# Patient Record
Sex: FEMALE | Race: BLACK OR AFRICAN AMERICAN | NOT HISPANIC OR LATINO | Employment: UNEMPLOYED | URBAN - METROPOLITAN AREA
[De-identification: names, ages, dates, MRNs, and addresses within clinical notes are randomized per-mention and may not be internally consistent; named-entity substitution may affect disease eponyms.]

---

## 2018-04-06 PROBLEM — I10 BENIGN HYPERTENSION: Status: ACTIVE | Noted: 2018-04-06

## 2018-04-06 PROBLEM — E78.5 HYPERLIPIDEMIA: Status: ACTIVE | Noted: 2018-04-06

## 2018-04-06 PROBLEM — K21.9 GERD (GASTROESOPHAGEAL REFLUX DISEASE): Status: ACTIVE | Noted: 2018-04-06

## 2018-05-10 ENCOUNTER — OFFICE VISIT (OUTPATIENT)
Dept: FAMILY MEDICINE | Facility: CLINIC | Age: 63
End: 2018-05-10
Payer: MEDICARE

## 2018-05-10 VITALS
DIASTOLIC BLOOD PRESSURE: 66 MMHG | SYSTOLIC BLOOD PRESSURE: 141 MMHG | BODY MASS INDEX: 28.88 KG/M2 | HEIGHT: 67 IN | RESPIRATION RATE: 18 BRPM | HEART RATE: 75 BPM | WEIGHT: 184 LBS

## 2018-05-10 DIAGNOSIS — E78.2 MIXED HYPERLIPIDEMIA: ICD-10-CM

## 2018-05-10 DIAGNOSIS — L72.0 EPIDERMOID CYST: Primary | ICD-10-CM

## 2018-05-10 PROCEDURE — 3008F BODY MASS INDEX DOCD: CPT | Mod: CPTII,S$GLB,, | Performed by: NURSE PRACTITIONER

## 2018-05-10 PROCEDURE — 99213 OFFICE O/P EST LOW 20 MIN: CPT | Mod: S$GLB,,, | Performed by: NURSE PRACTITIONER

## 2018-05-10 RX ORDER — POTASSIUM CHLORIDE 750 MG/1
10 TABLET, EXTENDED RELEASE ORAL DAILY
COMMUNITY
Start: 2018-04-02 | End: 2024-03-07

## 2018-05-10 RX ORDER — PHENOL/SODIUM PHENOLATE
20 AEROSOL, SPRAY (ML) MUCOUS MEMBRANE DAILY
COMMUNITY
End: 2018-08-07 | Stop reason: SDUPTHER

## 2018-05-10 RX ORDER — LOSARTAN POTASSIUM AND HYDROCHLOROTHIAZIDE 12.5; 1 MG/1; MG/1
1 TABLET ORAL DAILY
COMMUNITY
End: 2018-08-14 | Stop reason: SDUPTHER

## 2018-05-10 RX ORDER — AMLODIPINE BESYLATE 10 MG/1
10 TABLET ORAL DAILY
COMMUNITY
Start: 2018-04-02

## 2018-05-10 RX ORDER — PRAVASTATIN SODIUM 40 MG/1
40 TABLET ORAL DAILY
Qty: 90 TABLET | Refills: 4 | Status: SHIPPED | OUTPATIENT
Start: 2018-05-10 | End: 2024-03-07

## 2018-05-10 RX ORDER — DULOXETIN HYDROCHLORIDE 30 MG/1
30 CAPSULE, DELAYED RELEASE ORAL DAILY
Refills: 1 | COMMUNITY
Start: 2018-04-23 | End: 2018-08-14 | Stop reason: SDUPTHER

## 2018-05-10 RX ORDER — CLOTRIMAZOLE 1 %
1 CREAM (GRAM) TOPICAL DAILY
COMMUNITY
Start: 2018-05-03 | End: 2018-08-14 | Stop reason: SDUPTHER

## 2018-05-10 RX ORDER — PRAVASTATIN SODIUM 40 MG/1
40 TABLET ORAL DAILY
COMMUNITY
End: 2018-05-10 | Stop reason: SDUPTHER

## 2018-05-10 NOTE — PATIENT INSTRUCTIONS
Patient declines removal of left axilla epidermoid cyst at this time.  State she will return later if it begins to bother her. Mammogram results from 02/01/2018 reviewed- results normal. Pravastatin refilled.   Fasting labs ordered for net visit.

## 2018-05-10 NOTE — PROGRESS NOTES
"Chief Complaint  Chief Complaint   Patient presents with    Recurrent Skin Infections     right armpit x2 weeks        HPI  Rachel Sandoval is a 63 y.o. female with medical diagnoses as listed within the medical history and problem list that presents for lump under right axilla.     PAST MEDICAL HISTORY:    HTN, Depression, high cholesterol, GERD  PAST SURGICAL HISTORY:  History reviewed. No pertinent surgical history.    SOCIAL HISTORY:  Single, 5 adult children, current smoker    FAMILY HISTORY:  History reviewed. No pertinent family history.    ALLERGIES AND MEDICATIONS: updated and reviewed.  Review of patient's allergies indicates:  No Known Allergies  Current Outpatient Prescriptions   Medication Sig Dispense Refill    amLODIPine (NORVASC) 10 MG tablet Take 10 mg by mouth once daily.      clotrimazole (LOTRIMIN) 1 % cream Apply 1 application topically once daily.      DULoxetine (CYMBALTA) 30 MG capsule Take 30 mg by mouth once daily.  1    losartan-hydrochlorothiazide 100-12.5 mg (HYZAAR) 100-12.5 mg Tab Take 1 tablet by mouth once daily.      omeprazole 20 mg TbEC Take 20 mg by mouth once daily.      potassium chloride SA (K-DUR,KLOR-CON) 10 MEQ tablet Take 10 mEq by mouth once daily.      pravastatin (PRAVACHOL) 40 MG tablet Take 1 tablet (40 mg total) by mouth once daily. 90 tablet 4     No current facility-administered medications for this visit.          ROS  Review of Systems   Constitutional: Negative.    HENT: Negative.    Respiratory: Negative.    Cardiovascular: Negative.    Gastrointestinal: Negative.    Skin:        Firm mobile lump right axilla   Psychiatric/Behavioral: Negative.            PHYSICAL EXAM  Vitals:    05/10/18 1158   BP: (!) 141/66   BP Location: Left arm   Patient Position: Sitting   BP Method: Medium (Automatic)   Pulse: 75   Resp: 18   Weight: 83.5 kg (184 lb)   Height: 5' 7" (1.702 m)    Body mass index is 28.82 kg/m².  Weight: 83.5 kg (184 lb)   Height: 5' 7" (170.2 " cm)       Physical Exam   Constitutional: She is oriented to person, place, and time. She appears well-developed and well-nourished.   HENT:   Head: Normocephalic and atraumatic.   Neck: Normal range of motion. Neck supple.   Cardiovascular: Normal rate, regular rhythm and normal heart sounds.    Pulmonary/Chest: Effort normal and breath sounds normal. Right breast exhibits no inverted nipple, no mass, no nipple discharge, no skin change and no tenderness. Left breast exhibits no inverted nipple, no mass, no nipple discharge, no skin change and no tenderness. Breasts are symmetrical. There is no breast swelling.       Abdominal: Soft. Bowel sounds are normal.   Genitourinary: No breast tenderness, discharge or bleeding.   Musculoskeletal: Normal range of motion.   Lymphadenopathy:        Right axillary: No pectoral and no lateral adenopathy present.        Left axillary: No pectoral and no lateral adenopathy present.  Neurological: She is alert and oriented to person, place, and time.   Skin: Skin is warm and dry.   approx 2 cm firm mobile nontender nondraining mass in right axilla          Health Maintenance       Date Due Completion Date    Hepatitis C Screening 1955 ---    Lipid Panel 1955 ---    TETANUS VACCINE 02/12/1972 ---    Pap Smear with HPV Cotest 02/12/1975 ---    Mammogram 02/12/1994 ---    Colonoscopy 02/12/2004 ---    Zoster Vaccine 02/12/2014 ---    Influenza Vaccine 08/01/2018 ---               Assessment & Plan    Rachel was seen today for recurrent skin infections.    Diagnoses and all orders for this visit:    Epidermoid cyst    Mixed hyperlipidemia  -     Lipid panel; Future  -     Comprehensive metabolic panel; Future    Other orders  -     pravastatin (PRAVACHOL) 40 MG tablet; Take 1 tablet (40 mg total) by mouth once daily.        Follow-up: Follow-up if symptoms worsen or fail to improve.

## 2018-05-24 ENCOUNTER — OFFICE VISIT (OUTPATIENT)
Dept: FAMILY MEDICINE | Facility: CLINIC | Age: 63
End: 2018-05-24
Payer: MEDICARE

## 2018-05-24 VITALS
HEART RATE: 83 BPM | DIASTOLIC BLOOD PRESSURE: 72 MMHG | SYSTOLIC BLOOD PRESSURE: 133 MMHG | BODY MASS INDEX: 29.35 KG/M2 | WEIGHT: 187 LBS | TEMPERATURE: 97 F | HEIGHT: 67 IN | OXYGEN SATURATION: 98 % | RESPIRATION RATE: 18 BRPM

## 2018-05-24 DIAGNOSIS — L85.8 KERATOSIS PILARIS: ICD-10-CM

## 2018-05-24 DIAGNOSIS — R22.31 MASS OF RIGHT AXILLA: Primary | ICD-10-CM

## 2018-05-24 DIAGNOSIS — I10 BENIGN HYPERTENSION: ICD-10-CM

## 2018-05-24 DIAGNOSIS — E78.2 MIXED HYPERLIPIDEMIA: ICD-10-CM

## 2018-05-24 DIAGNOSIS — R21 RASH: ICD-10-CM

## 2018-05-24 PROCEDURE — 3008F BODY MASS INDEX DOCD: CPT | Mod: CPTII,S$GLB,, | Performed by: FAMILY MEDICINE

## 2018-05-24 PROCEDURE — 99213 OFFICE O/P EST LOW 20 MIN: CPT | Mod: S$GLB,,, | Performed by: FAMILY MEDICINE

## 2018-05-24 PROCEDURE — 3078F DIAST BP <80 MM HG: CPT | Mod: CPTII,S$GLB,, | Performed by: FAMILY MEDICINE

## 2018-05-24 PROCEDURE — 3075F SYST BP GE 130 - 139MM HG: CPT | Mod: CPTII,S$GLB,, | Performed by: FAMILY MEDICINE

## 2018-05-24 NOTE — PROGRESS NOTES
Subjective:       Patient ID: Rachel Sandoval is a 63 y.o. female.    Chief Complaint: Arm Pain (knot under her arm)    Complains of a lump in her right axilla.  Has been present for over 6 months, but seems to be getting larger and is now having some tenderness radiation up her arm.    She also has multiple raised bumps on her arms and legs.            Hyperlipidemia   This is a chronic problem. The current episode started more than 1 year ago. The problem is controlled. Recent lipid tests were reviewed and are normal. She has no history of chronic renal disease, diabetes, hypothyroidism or liver disease. Pertinent negatives include no chest pain, focal sensory loss, focal weakness, myalgias or shortness of breath. Current antihyperlipidemic treatment includes statins. The current treatment provides significant improvement of lipids. There are no compliance problems.    Hypertension   This is a chronic problem. The current episode started more than 1 year ago. The problem is unchanged. The problem is controlled. Pertinent negatives include no anxiety, chest pain, headaches, malaise/fatigue, neck pain, palpitations, peripheral edema or shortness of breath. There is no history of chronic renal disease.     Review of Systems   Constitutional: Negative for activity change, appetite change, chills, fatigue and malaise/fatigue.   HENT: Negative for congestion, ear discharge, ear pain, rhinorrhea, sinus pain, sore throat and trouble swallowing.    Eyes: Negative for photophobia, pain, redness, itching and visual disturbance.   Respiratory: Negative for cough, chest tightness, shortness of breath and wheezing.    Cardiovascular: Negative for chest pain, palpitations and leg swelling.   Gastrointestinal: Negative for abdominal distention, abdominal pain, blood in stool, diarrhea, nausea and vomiting.   Genitourinary: Negative for dysuria, pelvic pain, vaginal bleeding, vaginal discharge and vaginal pain.    Musculoskeletal: Negative for arthralgias, back pain, gait problem, myalgias and neck pain.   Skin: Negative for color change, pallor and rash.   Neurological: Negative for dizziness, tremors, focal weakness, weakness, light-headedness, numbness and headaches.   Psychiatric/Behavioral: Negative for agitation, behavioral problems, confusion and sleep disturbance.       Objective:      Physical Exam   Constitutional: She appears well-developed and well-nourished.   HENT:   Head: Normocephalic.   Right Ear: External ear normal.   Left Ear: External ear normal.   Mouth/Throat: Oropharynx is clear and moist.   Eyes: Conjunctivae and EOM are normal. Pupils are equal, round, and reactive to light.   Neck: Normal range of motion. Neck supple.   Cardiovascular: Normal rate, regular rhythm, normal heart sounds and intact distal pulses.    Pulmonary/Chest: Effort normal and breath sounds normal.   Neurological: She is alert.   Skin: Skin is warm and dry.   Has a 1 inch firm, non-moveable mass in her right axilla.  No redness or drainage present.      Multiple bumps on arms and legs consistent with pilaris keratosis.    Psychiatric: Her behavior is normal. Judgment normal.       ASSESSMENT and PLAN    Mass of right axilla  -     US Soft Tissue Axilla; Future; Expected date: 05/24/2018  -     CBC auto differential; Future; Expected date: 05/24/2018    Rash  -     Ambulatory referral to Dermatology    Benign hypertension  -     Basic metabolic panel; Future; Expected date: 05/24/2018    Mixed hyperlipidemia    Keratosis pilaris

## 2018-05-25 ENCOUNTER — HOSPITAL ENCOUNTER (OUTPATIENT)
Dept: RADIOLOGY | Facility: HOSPITAL | Age: 63
Discharge: HOME | End: 2018-05-25
Attending: FAMILY MEDICINE
Payer: MEDICARE

## 2018-05-25 DIAGNOSIS — R22.31 MASS OF RIGHT AXILLA: ICD-10-CM

## 2018-05-25 PROCEDURE — 76882 US LMTD JT/FCL EVL NVASC XTR: CPT | Mod: TC

## 2018-05-25 PROCEDURE — 76882 US LMTD JT/FCL EVL NVASC XTR: CPT | Mod: 26,,, | Performed by: RADIOLOGY

## 2018-08-07 RX ORDER — PHENOL/SODIUM PHENOLATE
20 AEROSOL, SPRAY (ML) MUCOUS MEMBRANE DAILY
COMMUNITY
Start: 2018-08-07 | End: 2024-03-07 | Stop reason: CLARIF

## 2018-08-14 RX ORDER — CLOTRIMAZOLE 1 %
1 CREAM (GRAM) TOPICAL DAILY
Qty: 60 G | Refills: 0 | Status: SHIPPED | OUTPATIENT
Start: 2018-08-14

## 2018-08-14 RX ORDER — DULOXETIN HYDROCHLORIDE 30 MG/1
30 CAPSULE, DELAYED RELEASE ORAL DAILY
Qty: 30 CAPSULE | Refills: 0 | Status: SHIPPED | OUTPATIENT
Start: 2018-08-14 | End: 2024-03-07 | Stop reason: SDUPTHER

## 2018-08-14 RX ORDER — LOSARTAN POTASSIUM AND HYDROCHLOROTHIAZIDE 12.5; 1 MG/1; MG/1
1 TABLET ORAL DAILY
Qty: 30 TABLET | Refills: 0 | Status: SHIPPED | OUTPATIENT
Start: 2018-08-14 | End: 2024-03-07

## 2018-08-14 NOTE — TELEPHONE ENCOUNTER
----- Message from Kalli Shepherd sent at 8/14/2018  1:55 PM CDT -----  Contact: Catherine from [facility]  .Type:  Pharmacy Calling to Clarify an RX    Name of Caller: Catherine  Pharm[facility] Name: [facility]   Prescription Name:  losartan-hydrochlorothiazide 100-12.5 mg (HYZAAR) 100-12.5 mg Tab, DULoxetine (CYMBALTA) 30 MG capsule, clotrimazole (LOTRIMIN) 1 % cream  What do they need to clarify?:  Refill requests haven't been sent in  Best Call Back Number: [phone]  Additional Information: requests were sent over on 08/07 & 08/09

## 2018-09-06 RX ORDER — LOSARTAN POTASSIUM AND HYDROCHLOROTHIAZIDE 12.5; 1 MG/1; MG/1
TABLET ORAL
Qty: 30 TABLET | Refills: 0 | OUTPATIENT
Start: 2018-09-06

## 2018-09-06 RX ORDER — DULOXETIN HYDROCHLORIDE 30 MG/1
CAPSULE, DELAYED RELEASE ORAL
Qty: 30 CAPSULE | Refills: 0 | OUTPATIENT
Start: 2018-09-06

## 2018-09-14 RX ORDER — LOSARTAN POTASSIUM AND HYDROCHLOROTHIAZIDE 12.5; 1 MG/1; MG/1
TABLET ORAL
Qty: 30 TABLET | Refills: 0 | OUTPATIENT
Start: 2018-09-14

## 2018-09-14 RX ORDER — DULOXETIN HYDROCHLORIDE 30 MG/1
CAPSULE, DELAYED RELEASE ORAL
Qty: 30 CAPSULE | Refills: 0 | OUTPATIENT
Start: 2018-09-14

## 2024-03-07 ENCOUNTER — OFFICE VISIT (OUTPATIENT)
Dept: FAMILY MEDICINE | Facility: CLINIC | Age: 69
End: 2024-03-07
Payer: MEDICARE

## 2024-03-07 VITALS — WEIGHT: 165 LBS | HEART RATE: 78 BPM | BODY MASS INDEX: 25.9 KG/M2 | OXYGEN SATURATION: 98 % | HEIGHT: 67 IN

## 2024-03-07 DIAGNOSIS — Z11.59 ENCOUNTER FOR HEPATITIS C SCREENING TEST FOR LOW RISK PATIENT: ICD-10-CM

## 2024-03-07 DIAGNOSIS — L60.0 IGTN (INGROWING TOE NAIL): ICD-10-CM

## 2024-03-07 DIAGNOSIS — F32.5 MAJOR DEPRESSIVE DISORDER WITH SINGLE EPISODE, IN REMISSION: ICD-10-CM

## 2024-03-07 DIAGNOSIS — I10 PRIMARY HYPERTENSION: ICD-10-CM

## 2024-03-07 DIAGNOSIS — R73.9 ELEVATED BLOOD SUGAR: ICD-10-CM

## 2024-03-07 DIAGNOSIS — K21.9 GASTROESOPHAGEAL REFLUX DISEASE WITHOUT ESOPHAGITIS: ICD-10-CM

## 2024-03-07 DIAGNOSIS — E78.2 MIXED HYPERLIPIDEMIA: Primary | ICD-10-CM

## 2024-03-07 DIAGNOSIS — I10 ESSENTIAL HYPERTENSION, BENIGN: ICD-10-CM

## 2024-03-07 PROCEDURE — 99204 OFFICE O/P NEW MOD 45 MIN: CPT | Mod: S$GLB,,, | Performed by: INTERNAL MEDICINE

## 2024-03-07 PROCEDURE — 1159F MED LIST DOCD IN RCRD: CPT | Mod: CPTII,S$GLB,, | Performed by: INTERNAL MEDICINE

## 2024-03-07 PROCEDURE — 1160F RVW MEDS BY RX/DR IN RCRD: CPT | Mod: CPTII,S$GLB,, | Performed by: INTERNAL MEDICINE

## 2024-03-07 PROCEDURE — 4010F ACE/ARB THERAPY RXD/TAKEN: CPT | Mod: CPTII,S$GLB,, | Performed by: INTERNAL MEDICINE

## 2024-03-07 PROCEDURE — 3008F BODY MASS INDEX DOCD: CPT | Mod: CPTII,S$GLB,, | Performed by: INTERNAL MEDICINE

## 2024-03-07 PROCEDURE — 1126F AMNT PAIN NOTED NONE PRSNT: CPT | Mod: CPTII,S$GLB,, | Performed by: INTERNAL MEDICINE

## 2024-03-07 RX ORDER — LOSARTAN POTASSIUM 100 MG/1
100 TABLET ORAL DAILY
Qty: 90 TABLET | Refills: 3 | Status: SHIPPED | OUTPATIENT
Start: 2024-03-07 | End: 2025-03-07

## 2024-03-07 RX ORDER — ROSUVASTATIN CALCIUM 20 MG/1
20 TABLET, COATED ORAL NIGHTLY
COMMUNITY

## 2024-03-07 RX ORDER — DULOXETIN HYDROCHLORIDE 30 MG/1
30 CAPSULE, DELAYED RELEASE ORAL DAILY
Qty: 90 CAPSULE | Refills: 3 | Status: SHIPPED | OUTPATIENT
Start: 2024-03-07 | End: 2025-03-08

## 2024-03-07 RX ORDER — OMEPRAZOLE 40 MG/1
40 CAPSULE, DELAYED RELEASE ORAL DAILY
Qty: 30 CAPSULE | Refills: 2 | Status: SHIPPED | OUTPATIENT
Start: 2024-03-07 | End: 2024-06-05

## 2024-03-08 NOTE — PROGRESS NOTES
Subjective:       Patient ID: Rachel Sandoval is a 69 y.o. female.    Chief Complaint: Establish Care, Hypertension, and Hyperlipidemia      Mr./.is a new patient who presents today to establish care and for management of the chronic problems , refills and preventive medicine      Hypertension  This is a chronic problem. The current episode started more than 1 year ago. The problem has been waxing and waning since onset. The problem is controlled. Associated symptoms include anxiety. Agents associated with hypertension include NSAIDs. Risk factors for coronary artery disease include dyslipidemia, stress, sedentary lifestyle and post-menopausal state. Past treatments include angiotensin blockers. The current treatment provides moderate improvement. Compliance problems include diet and exercise.  Hypertensive end-organ damage includes kidney disease. Identifiable causes of hypertension include chronic renal disease.   Hyperlipidemia  Exacerbating diseases include chronic renal disease.     Review of Systems   Constitutional:  Negative for activity change, fever and unexpected weight change.   Respiratory: Negative.     Cardiovascular: Negative.    Neurological: Negative.          Objective:      Physical Exam  Vitals and nursing note reviewed.   Constitutional:       Appearance: Normal appearance.   Cardiovascular:      Rate and Rhythm: Normal rate and regular rhythm.   Pulmonary:      Effort: Pulmonary effort is normal.      Breath sounds: Normal breath sounds.   Musculoskeletal:      Cervical back: Normal range of motion and neck supple.   Neurological:      Mental Status: She is alert.         Assessment:       1. Mixed hyperlipidemia  Overview:  Now on crestor    Assessment & Plan:  Check lipids, LFTs    Orders:  -     Lipid Panel; Future; Expected date: 03/07/2024    2. Essential hypertension, benign  -     Comprehensive Metabolic Panel; Future; Expected date: 03/07/2024  -     Microalbumin/Creatinine  Ratio, Urine; Future; Expected date: 03/07/2024    3. Encounter for hepatitis C screening test for low risk patient  -     Hepatitis C Antibody; Future; Expected date: 03/07/2024    4. Elevated blood sugar  -     Hemoglobin A1C; Standing    5. IGTN (ingrowing toe nail)  -     Ambulatory referral/consult to Podiatry; Future; Expected date: 03/14/2024    6. Primary hypertension  Overview:  At goal    Assessment & Plan:  Now on losartan with no HCZ  D/c potassium  Check renal fx, ..........      7. Gastroesophageal reflux disease without esophagitis  Overview:  Worse recently    Assessment & Plan:  Diet modif  Refill prilosec      8. Major depressive disorder with single episode, in remission  Overview:  Stable   No SI, HI    Assessment & Plan:  Refill cymbalta      Other orders  -     losartan (COZAAR) 100 MG tablet; Take 1 tablet (100 mg total) by mouth once daily.  Dispense: 90 tablet; Refill: 3  -     DULoxetine (CYMBALTA) 30 MG capsule; Take 1 capsule (30 mg total) by mouth once daily.  Dispense: 90 capsule; Refill: 3  -     omeprazole (PRILOSEC) 40 MG capsule; Take 1 capsule (40 mg total) by mouth once daily.  Dispense: 30 capsule; Refill: 2           Plan:       1. Mixed hyperlipidemia  Overview:  Now on crestor    Assessment & Plan:  Check lipids, LFTs    Orders:  -     Lipid Panel; Future; Expected date: 03/07/2024    2. Essential hypertension, benign  -     Comprehensive Metabolic Panel; Future; Expected date: 03/07/2024  -     Microalbumin/Creatinine Ratio, Urine; Future; Expected date: 03/07/2024    3. Encounter for hepatitis C screening test for low risk patient  -     Hepatitis C Antibody; Future; Expected date: 03/07/2024    4. Elevated blood sugar  -     Hemoglobin A1C; Standing    5. IGTN (ingrowing toe nail)  -     Ambulatory referral/consult to Podiatry; Future; Expected date: 03/14/2024    6. Primary hypertension  Overview:  At goal    Assessment & Plan:  Now on losartan with no HCZ  D/c  potassium  Check renal fx, ..........      7. Gastroesophageal reflux disease without esophagitis  Overview:  Worse recently    Assessment & Plan:  Diet modif  Refill prilosec      8. Major depressive disorder with single episode, in remission  Overview:  Stable   No SI, HI    Assessment & Plan:  Refill cymbalta      Other orders  -     losartan (COZAAR) 100 MG tablet; Take 1 tablet (100 mg total) by mouth once daily.  Dispense: 90 tablet; Refill: 3  -     DULoxetine (CYMBALTA) 30 MG capsule; Take 1 capsule (30 mg total) by mouth once daily.  Dispense: 90 capsule; Refill: 3  -     omeprazole (PRILOSEC) 40 MG capsule; Take 1 capsule (40 mg total) by mouth once daily.  Dispense: 30 capsule; Refill: 2

## 2024-03-15 ENCOUNTER — LAB VISIT (OUTPATIENT)
Dept: LAB | Facility: CLINIC | Age: 69
End: 2024-03-15
Payer: MEDICARE

## 2024-03-15 ENCOUNTER — OFFICE VISIT (OUTPATIENT)
Dept: PODIATRY | Facility: CLINIC | Age: 69
End: 2024-03-15
Payer: MEDICARE

## 2024-03-15 VITALS — BODY MASS INDEX: 26.71 KG/M2 | HEIGHT: 67 IN | WEIGHT: 170.19 LBS

## 2024-03-15 DIAGNOSIS — I10 ESSENTIAL HYPERTENSION, BENIGN: ICD-10-CM

## 2024-03-15 DIAGNOSIS — M20.12 VALGUS DEFORMITY OF BOTH GREAT TOES: Primary | ICD-10-CM

## 2024-03-15 DIAGNOSIS — E78.2 MIXED HYPERLIPIDEMIA: ICD-10-CM

## 2024-03-15 DIAGNOSIS — G60.9 IDIOPATHIC PERIPHERAL NEUROPATHY: ICD-10-CM

## 2024-03-15 DIAGNOSIS — Z11.59 ENCOUNTER FOR HEPATITIS C SCREENING TEST FOR LOW RISK PATIENT: ICD-10-CM

## 2024-03-15 DIAGNOSIS — L84 CORN OR CALLUS: ICD-10-CM

## 2024-03-15 DIAGNOSIS — M20.11 VALGUS DEFORMITY OF BOTH GREAT TOES: Primary | ICD-10-CM

## 2024-03-15 DIAGNOSIS — L60.9 DISEASE OF NAIL: ICD-10-CM

## 2024-03-15 DIAGNOSIS — Z78.0 MENOPAUSE: ICD-10-CM

## 2024-03-15 DIAGNOSIS — I73.9 ASYMPTOMATIC PVD (PERIPHERAL VASCULAR DISEASE): ICD-10-CM

## 2024-03-15 DIAGNOSIS — R73.9 ELEVATED BLOOD SUGAR: ICD-10-CM

## 2024-03-15 LAB
ALBUMIN SERPL BCP-MCNC: 3.9 G/DL (ref 3.5–5.2)
ALBUMIN/CREAT UR: 4.8 UG/MG (ref 0–30)
ALP SERPL-CCNC: 84 U/L (ref 55–135)
ALT SERPL W/O P-5'-P-CCNC: 12 U/L (ref 10–44)
ANION GAP SERPL CALC-SCNC: 8 MMOL/L (ref 8–16)
AST SERPL-CCNC: 9 U/L (ref 10–40)
BILIRUB SERPL-MCNC: 0.4 MG/DL (ref 0.1–1)
BUN SERPL-MCNC: 12 MG/DL (ref 8–23)
CALCIUM SERPL-MCNC: 9.3 MG/DL (ref 8.7–10.5)
CHLORIDE SERPL-SCNC: 108 MMOL/L (ref 95–110)
CHOLEST SERPL-MCNC: 140 MG/DL (ref 120–199)
CHOLEST/HDLC SERPL: 3.2 {RATIO} (ref 2–5)
CO2 SERPL-SCNC: 28 MMOL/L (ref 23–29)
CREAT SERPL-MCNC: 0.9 MG/DL (ref 0.5–1.4)
CREAT UR-MCNC: 189 MG/DL (ref 15–325)
EST. GFR  (NO RACE VARIABLE): >60 ML/MIN/1.73 M^2
ESTIMATED AVG GLUCOSE: 114 MG/DL (ref 68–131)
GLUCOSE SERPL-MCNC: 98 MG/DL (ref 70–110)
HBA1C MFR BLD: 5.6 % (ref 4–5.6)
HCV AB SERPL QL IA: NORMAL
HDLC SERPL-MCNC: 44 MG/DL (ref 40–75)
HDLC SERPL: 31.4 % (ref 20–50)
LDLC SERPL CALC-MCNC: 84 MG/DL (ref 63–159)
MICROALBUMIN UR DL<=1MG/L-MCNC: 9 UG/ML
NONHDLC SERPL-MCNC: 96 MG/DL
POTASSIUM SERPL-SCNC: 3.6 MMOL/L (ref 3.5–5.1)
PROT SERPL-MCNC: 7.2 G/DL (ref 6–8.4)
SODIUM SERPL-SCNC: 144 MMOL/L (ref 136–145)
TRIGL SERPL-MCNC: 60 MG/DL (ref 30–150)

## 2024-03-15 PROCEDURE — 80053 COMPREHEN METABOLIC PANEL: CPT | Performed by: INTERNAL MEDICINE

## 2024-03-15 PROCEDURE — 83036 HEMOGLOBIN GLYCOSYLATED A1C: CPT | Performed by: INTERNAL MEDICINE

## 2024-03-15 PROCEDURE — 11721 DEBRIDE NAIL 6 OR MORE: CPT | Mod: 59,Q9,S$GLB, | Performed by: PODIATRIST

## 2024-03-15 PROCEDURE — 99204 OFFICE O/P NEW MOD 45 MIN: CPT | Mod: 25,S$GLB,, | Performed by: PODIATRIST

## 2024-03-15 PROCEDURE — 36415 COLL VENOUS BLD VENIPUNCTURE: CPT | Mod: ,,, | Performed by: INTERNAL MEDICINE

## 2024-03-15 PROCEDURE — 3061F NEG MICROALBUMINURIA REV: CPT | Mod: CPTII,S$GLB,, | Performed by: PODIATRIST

## 2024-03-15 PROCEDURE — 3008F BODY MASS INDEX DOCD: CPT | Mod: CPTII,S$GLB,, | Performed by: PODIATRIST

## 2024-03-15 PROCEDURE — 82043 UR ALBUMIN QUANTITATIVE: CPT | Performed by: INTERNAL MEDICINE

## 2024-03-15 PROCEDURE — 86803 HEPATITIS C AB TEST: CPT | Performed by: INTERNAL MEDICINE

## 2024-03-15 PROCEDURE — 11056 PARNG/CUTG B9 HYPRKR LES 2-4: CPT | Mod: Q9,S$GLB,, | Performed by: PODIATRIST

## 2024-03-15 PROCEDURE — 1126F AMNT PAIN NOTED NONE PRSNT: CPT | Mod: CPTII,S$GLB,, | Performed by: PODIATRIST

## 2024-03-15 PROCEDURE — 3066F NEPHROPATHY DOC TX: CPT | Mod: CPTII,S$GLB,, | Performed by: PODIATRIST

## 2024-03-15 PROCEDURE — 1159F MED LIST DOCD IN RCRD: CPT | Mod: CPTII,S$GLB,, | Performed by: PODIATRIST

## 2024-03-15 PROCEDURE — 4010F ACE/ARB THERAPY RXD/TAKEN: CPT | Mod: CPTII,S$GLB,, | Performed by: PODIATRIST

## 2024-03-15 PROCEDURE — 1160F RVW MEDS BY RX/DR IN RCRD: CPT | Mod: CPTII,S$GLB,, | Performed by: PODIATRIST

## 2024-03-15 PROCEDURE — 3044F HG A1C LEVEL LT 7.0%: CPT | Mod: CPTII,S$GLB,, | Performed by: PODIATRIST

## 2024-03-15 PROCEDURE — 80061 LIPID PANEL: CPT | Performed by: INTERNAL MEDICINE

## 2024-04-03 NOTE — PROGRESS NOTES
Subjective:     Patient ID: Rachel Sandoval is a 69 y.o. female.    Chief Complaint: Nail Problem and Callouses    Rachel is a 69 y.o. female who presents to the clinic for evaluation and treatment of high risk feet. Rachel has no past medical history on file. The patient's chief complaint is long, thick toenails. This patient has documented high risk feet requiring routine maintenance secondary to peripheral neuropathy.    PCP: Gulshan Casarez MD    Date Last Seen by PCP: 03/07/2024    Current shoe gear:  Affected Foot: Casual shoes     Unaffected Foot: Casual shoes    Last encounter in this department: Visit date not found    Hemoglobin A1C   Date Value Ref Range Status   03/15/2024 5.6 4.0 - 5.6 % Final     Comment:     ADA Screening Guidelines:  5.7-6.4%  Consistent with prediabetes  >or=6.5%  Consistent with diabetes    High levels of fetal hemoglobin interfere with the HbA1C  assay. Heterozygous hemoglobin variants (HbS, HgC, etc)do  not significantly interfere with this assay.   However, presence of multiple variants may affect accuracy.         Review of Systems   Constitutional: Negative for chills and fever.   Cardiovascular:  Positive for leg swelling. Negative for chest pain.   Respiratory:  Negative for cough and shortness of breath.    Gastrointestinal:  Negative for diarrhea, nausea and vomiting.   Neurological:  Positive for numbness and paresthesias.        Objective:     Physical Exam  Vitals reviewed.   Constitutional:       General: She is not in acute distress.     Appearance: Normal appearance. She is not ill-appearing.   HENT:      Head: Normocephalic.      Nose: Nose normal.   Pulmonary:      Effort: Pulmonary effort is normal. No respiratory distress.   Skin:     Capillary Refill: Capillary refill takes 2 to 3 seconds.   Neurological:      Mental Status: She is alert and oriented to person, place, and time.   Psychiatric:         Mood and Affect: Mood normal.         Behavior:  Behavior normal.         Thought Content: Thought content normal.         Judgment: Judgment normal.     Neurologic: Protective and light touch sensation intact bilateral lower extremity, positive paresthesias reported, positive occasional numbness reported   Musculoskeletal:  5/5 muscle strength noted bilateral foot, ankle joint range of motion is reduced without pain, medial deviation of 1st metatarsal with prominent dorsal medial eminence bilateral 1st MPJ/bunion bilateral 1st MPJ, mild tenderness with palpation of calluses bilateral 1st MPJ  Dermatologic:  Nails 1 through 5 bilateral are thickened, discolored and elongated, hyperkeratotic skin lesion noted to the plantar medial aspect bilateral 1st MPJ, no open lesions noted bilateral foot, no rashes noted bilateral foot, no interdigital maceration noted bilateral foot, atrophic soft tissue skin changes noted bilateral lower extremity  Vascular: DP and PT pulses palpable 1/4 bilateral foot, capillary fill time less 3 seconds digits aspect of the digits bilateral foot, pedal hair growth is absent to distal forefoot bilateral lower extremity, soft tissue atrophic skin changes noted      Assessment:      Encounter Diagnoses   Name Primary?    Valgus deformity of both great toes Yes    Corn or callus     Disease of nail     Asymptomatic PVD (peripheral vascular disease)     Idiopathic peripheral neuropathy      Plan:     Rachel was seen today for nail problem and callouses.    Diagnoses and all orders for this visit:    Valgus deformity of both great toes    Corn or callus  -     Routine Foot Care    Disease of nail  -     Routine Foot Care    Asymptomatic PVD (peripheral vascular disease)  -     Routine Foot Care    Idiopathic peripheral neuropathy  -     Routine Foot Care      I counseled the patient on her conditions, their implications and medical management.        1. Patient was examined and evaluated.  2. Discussed with patient etiology of callus formation.   Patient was warned of potential recurrence over time.  Patient was dispensed offloading pads for reduction of direct contact to the lesion.  Patient was advised to perform safe debridement at home with a emery board, nail file or pumice stone.  Patient was advised to apply ointments / moisturizer to the area for softening purposes.  Routine Foot Care    Date/Time: 3/15/2024 11:30 AM    Performed by: Vijay Guerrero DPM  Authorized by: Vijay Guerrero DPM    Consent Done?:  Yes (Verbal)  Hyperkeratotic Skin Lesions?: Yes    Number of trimmed lesions:  2  Location(s):  Left 1st Metatarsal Head and Right 1st Metatarsal Head    Nail Care Type:  Debride  Location(s): All  (Left 1st Toe, Left 3rd Toe, Left 2nd Toe, Left 4th Toe, Left 5th Toe, Right 1st Toe, Right 2nd Toe, Right 3rd Toe, Right 4th Toe and Right 5th Toe)  Patient tolerance:  Patient tolerated the procedure well with no immediate complications      3. Discussed with patient the etiology of nail fungus and as possible secondary issue to prior nail trauma.  Discussed with patient OTC topical conservative treatments such as Vicks vapor rub, tea tree oil as well as coconut oil.  Discussed with patient risks and benefits oral Lamisil therapy.   4. Discussed with patient etiology of bunion deformity/hallux abductovalgus.  Discussed conservative treatment options with the patient.  Patient was advised to adjust shoe gear for better comfort and fit including increased toe box height and width and selection shoe gear with soft stretchable uppers.  Discussed with patient potential benefits of a local corticosteroid injection or oral Medrol Dosepak for improvement of pain and inflammation.  Patient was advised that she can adjunct with NSAIDs for pain relief as well.  Briefly discussed with patient surgical intervention including Deshawn bunionectomy.    5. Discussed with patient etiology of neuropathy and numbness and tingling.  Patient was advised to continue  with oral Cymbalta for improvement of symptoms.  Patient will monitor symptoms for possible progression over time and will consider increase in dosage or frequency of oral medications.    6. Patient will follow-up in 3 months or p.r.n. for complaints

## 2025-04-17 DIAGNOSIS — Z78.0 MENOPAUSE: ICD-10-CM
